# Patient Record
Sex: FEMALE | Race: OTHER
[De-identification: names, ages, dates, MRNs, and addresses within clinical notes are randomized per-mention and may not be internally consistent; named-entity substitution may affect disease eponyms.]

---

## 2023-03-27 ENCOUNTER — APPOINTMENT (OUTPATIENT)
Dept: OBGYN | Facility: CLINIC | Age: 53
End: 2023-03-27

## 2023-04-13 ENCOUNTER — NON-APPOINTMENT (OUTPATIENT)
Age: 53
End: 2023-04-13

## 2023-04-13 ENCOUNTER — APPOINTMENT (OUTPATIENT)
Dept: OBGYN | Facility: CLINIC | Age: 53
End: 2023-04-13
Payer: MEDICAID

## 2023-04-13 VITALS
DIASTOLIC BLOOD PRESSURE: 78 MMHG | SYSTOLIC BLOOD PRESSURE: 120 MMHG | WEIGHT: 140 LBS | BODY MASS INDEX: 22.5 KG/M2 | HEIGHT: 66 IN

## 2023-04-13 DIAGNOSIS — Z78.0 ASYMPTOMATIC MENOPAUSAL STATE: ICD-10-CM

## 2023-04-13 DIAGNOSIS — Z01.419 ENCOUNTER FOR GYNECOLOGICAL EXAMINATION (GENERAL) (ROUTINE) W/OUT ABNORMAL FINDINGS: ICD-10-CM

## 2023-04-13 DIAGNOSIS — Z12.39 ENCOUNTER FOR OTHER SCREENING FOR MALIGNANT NEOPLASM OF BREAST: ICD-10-CM

## 2023-04-13 PROCEDURE — 99386 PREV VISIT NEW AGE 40-64: CPT

## 2023-04-13 RX ORDER — ESTRADIOL 0.5 MG/1
0.5 TABLET ORAL
Refills: 0 | Status: ACTIVE | COMMUNITY

## 2023-04-13 RX ORDER — PROGESTERONE 100 MG/1
100 CAPSULE ORAL
Refills: 0 | Status: ACTIVE | COMMUNITY

## 2023-04-13 NOTE — HISTORY OF PRESENT ILLNESS
[FreeTextEntry1] : NIXON MONTOYA is a 52 year old  female that presents without complaints for an initial well woman exam. \par \par Patient reports LMP 2021, is not currently sexually active. \par Denies UTI symptoms, vaginal discomfort, AUB, or pelvic pain. \par \par GYN hx: abnormal pap smears with HPV requiring 2 LEEPs. Last pap 2023 requiring colposcopy which was normal\par Patient reports needing a left breast biopsy a few years ago but is benign.\par \par Currently taking estrogen and progesterone due to hot flashes and insomnia for the past year but is going to begin titrating medication down. \par \par Denies any significant medical or surgical history. [Patient reported mammogram was normal] : Patient reported mammogram was normal [Patient reported breast sonogram was normal] : Patient reported breast sonogram was normal [Patient reported PAP Smear was abnormal] : Patient reported PAP Smear was abnormal [Patient reported bone density results were normal] : Patient reported bone density results were normal [Patient reported colonoscopy was normal] : Patient reported colonoscopy was normal [postmenopausal] : postmenopausal [N] : Patient is not sexually active [Y] : Positive pregnancy history [Mammogramdate] : 02/22 [BreastSonogramDate] : 02/22 [PapSmeardate] : 02/22 [BoneDensityDate] : 03/22 [ColonoscopyDate] : 004/23 [PGHxTotal] : 2 [PGHxABInduced] : 1 [Hot Flashes] : hot flashes [Insomnia] : insomnia [Yes] : yes [No] : Patient does not have concerns regarding sex [Previously active] : previously active

## 2023-04-16 LAB — HPV HIGH+LOW RISK DNA PNL CVX: NOT DETECTED

## 2023-04-17 LAB — CYTOLOGY CVX/VAG DOC THIN PREP: NORMAL
